# Patient Record
Sex: FEMALE | Race: ASIAN | NOT HISPANIC OR LATINO | URBAN - METROPOLITAN AREA
[De-identification: names, ages, dates, MRNs, and addresses within clinical notes are randomized per-mention and may not be internally consistent; named-entity substitution may affect disease eponyms.]

---

## 2018-07-30 ENCOUNTER — OUTPATIENT (OUTPATIENT)
Dept: OUTPATIENT SERVICES | Facility: HOSPITAL | Age: 68
LOS: 1 days | End: 2018-07-30
Payer: MEDICARE

## 2018-07-30 ENCOUNTER — APPOINTMENT (OUTPATIENT)
Dept: MRI IMAGING | Facility: CLINIC | Age: 68
End: 2018-07-30
Payer: MEDICARE

## 2018-07-30 ENCOUNTER — APPOINTMENT (OUTPATIENT)
Dept: RADIOLOGY | Facility: CLINIC | Age: 68
End: 2018-07-30
Payer: MEDICARE

## 2018-07-30 DIAGNOSIS — G57.10 MERALGIA PARESTHETICA, UNSPECIFIED LOWER LIMB: ICD-10-CM

## 2018-07-30 PROBLEM — Z00.00 ENCOUNTER FOR PREVENTIVE HEALTH EXAMINATION: Status: ACTIVE | Noted: 2018-07-30

## 2018-07-30 PROCEDURE — 72110 X-RAY EXAM L-2 SPINE 4/>VWS: CPT | Mod: 26

## 2018-07-30 PROCEDURE — 73721 MRI JNT OF LWR EXTRE W/O DYE: CPT

## 2018-07-30 PROCEDURE — 73721 MRI JNT OF LWR EXTRE W/O DYE: CPT | Mod: 26,LT

## 2018-07-30 PROCEDURE — 72110 X-RAY EXAM L-2 SPINE 4/>VWS: CPT

## 2019-06-11 ENCOUNTER — OUTPATIENT (OUTPATIENT)
Dept: OUTPATIENT SERVICES | Facility: HOSPITAL | Age: 69
LOS: 1 days | End: 2019-06-11
Payer: MEDICARE

## 2019-06-11 ENCOUNTER — APPOINTMENT (OUTPATIENT)
Dept: RADIOLOGY | Facility: CLINIC | Age: 69
End: 2019-06-11
Payer: MEDICARE

## 2019-06-11 DIAGNOSIS — Z00.8 ENCOUNTER FOR OTHER GENERAL EXAMINATION: ICD-10-CM

## 2019-06-11 PROCEDURE — 73522 X-RAY EXAM HIPS BI 3-4 VIEWS: CPT

## 2019-06-11 PROCEDURE — 73522 X-RAY EXAM HIPS BI 3-4 VIEWS: CPT | Mod: 26

## 2020-09-15 ENCOUNTER — APPOINTMENT (OUTPATIENT)
Dept: ORTHOPEDIC SURGERY | Facility: CLINIC | Age: 70
End: 2020-09-15
Payer: MEDICARE

## 2020-09-15 VITALS
DIASTOLIC BLOOD PRESSURE: 80 MMHG | SYSTOLIC BLOOD PRESSURE: 142 MMHG | WEIGHT: 143 LBS | HEIGHT: 65 IN | BODY MASS INDEX: 23.82 KG/M2

## 2020-09-15 PROCEDURE — 73610 X-RAY EXAM OF ANKLE: CPT | Mod: LT

## 2020-09-15 PROCEDURE — 99203 OFFICE O/P NEW LOW 30 MIN: CPT

## 2020-11-04 NOTE — DISCUSSION/SUMMARY
[de-identified] : 70-year-old female with left ankle pain status post ORIF\par \par Patient was treated in an outside institution for a complex left ankle fracture dislocation.  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  Considering patient's symptoms, recommendation would be for continued physical therapy at this time.  Would recommend follow-up in 3 months.\par \par Continue weightbearing as tolerated, ice/NSAIDs as needed\par \par Follow-up 3 months

## 2020-11-04 NOTE — PHYSICAL EXAM
[de-identified] : Oriented to time, place, person\par Mood: Normal\par Affect: Normal\par Appearance: Healthy, well appearing, no acute distress.\par Gait: Normal\par Assistive Devices: None\par \par Left ankle exam\par \par Skin: Incisions clean, dry, intact\par Inspection: No obvious malalignment, mild swelling, no effusion\par Pulses: 2+ DP/PT pulses\par ROM: 5 degrees of dorsiflexion, 10 degrees of plantarflexion, normal subtalar motion. \par Tenderness: Tender over the lateral malleolus, tender medial joint.\par Stability: Stable\par Strength: Intact TA/GS/EHL\par Neuro: In tact to light touch throughout [de-identified] : \par The following radiographs were ordered and read by me during this patients visit. I reviewed each radiograph in detail with the patient and discussed the findings as highlighted below. \par \par 3 views of the left ankle were obtained today that show prior open reduction internal fixation of the lateral malleolus and posterior malleolus with what appears to be malunion/nonunion medial malleolus.

## 2020-11-04 NOTE — HISTORY OF PRESENT ILLNESS
[de-identified] : 70 year old female s/p left ankle ORIF at an outside institution presents today with left ankle pain x 6 months. She completed 2 months of PT after the surgery. The pain is constant worse with weather changes. Tylenol is helpful. C/O swelling. Reports numbness in the foot at the end of day. \par \par The patient's past medical history, past surgical history, medications and allergies were reviewed by me today with the patient and documented accordingly. In addition, the patient's family and social history, which were noncontributory to this visit, were reviewed also.

## 2020-12-14 ENCOUNTER — APPOINTMENT (OUTPATIENT)
Dept: ORTHOPEDIC SURGERY | Facility: CLINIC | Age: 70
End: 2020-12-14
Payer: MEDICARE

## 2020-12-14 VITALS — TEMPERATURE: 96.5 F

## 2020-12-14 DIAGNOSIS — Z98.890 OTHER SPECIFIED POSTPROCEDURAL STATES: ICD-10-CM

## 2020-12-14 DIAGNOSIS — Z87.81 OTHER SPECIFIED POSTPROCEDURAL STATES: ICD-10-CM

## 2020-12-14 PROCEDURE — 99213 OFFICE O/P EST LOW 20 MIN: CPT

## 2020-12-14 PROCEDURE — 99072 ADDL SUPL MATRL&STAF TM PHE: CPT

## 2020-12-14 NOTE — HISTORY OF PRESENT ILLNESS
[de-identified] : 70 year old female s/p left ankle ORIF 3/28/20 by LINDSEY Gracia presents today for follow up of left ankle pain. She is attending PT 2 x per week and reports improvement of pain. The pain is constant worse with weather changes. Tylenol is helpful. C/O swelling. Reports improvement of numbness in the foot. Also complaining of right degenerative knee pain since Thanksgiving, and has a diagnosis of arthrosis.

## 2020-12-14 NOTE — PHYSICAL EXAM
[de-identified] : Oriented to time, place, person\par Mood: Normal\par Affect: Normal\par Appearance: Healthy, well appearing, no acute distress.\par Gait: Normal\par Assistive Devices: None\par \par Left ankle exam\par \par Skin: Incisions clean, dry, intact\par Inspection: No obvious malalignment, minimal swelling lateral ankle, no effusion\par Pulses: 2+ DP/PT pulses\par ROM: 10 degrees of dorsiflexion, 20 degrees of plantarflexion, normal subtalar motion. \par Tenderness: Tender over the lateral malleolus, tender medial joint.\par Stability: Stable\par Strength: Intact TA/GS/EHL\par Neuro: In tact to light touch throughout [de-identified] : 3 views of the left ankle were obtained 9.15.2020 that show prior open reduction internal fixation of the lateral malleolus and posterior malleolus with what appears to be malunion/nonunion medial malleolus.

## 2020-12-14 NOTE — REASON FOR VISIT
[Initial Visit] : an initial visit for [FreeTextEntry2] : Left ankle pain  [Follow-Up Visit] : a follow-up visit for [Aftercare Following Surgery] : aftercare following surgery

## 2020-12-14 NOTE — ADDENDUM
[FreeTextEntry1] : This note was written by Ariana Barrios on 12/14/2020 acting solely as a scribe for Dr. Geovany Whittaker.\par \par All medical record entries made by the Scribe were at my, Dr. Geovany Whittaker, direction and personally dictated by me on 12/14/2020. I have personally reviewed the chart and agree that the record accurately reflects my personal performance of the history, physical exam, assessment and plan.

## 2020-12-14 NOTE — DISCUSSION/SUMMARY
[de-identified] : 70-year-old female with left ankle pain status post ORIF\par \par Patient was treated in an outside institution for a complex left ankle fracture dislocation.  Patient is doing well. I recommend continuation of PT as patient is continuing to report significant improvements of function at this time.  Patient still walks with an antalgic gait, and is currently experiencing contralateral knee pains consistent with arthrosis.\par \par Continue weightbearing as tolerated, PT, ice/NSAID, activity to tolerance.\par \par Follow-up as needed